# Patient Record
Sex: MALE | Race: OTHER | HISPANIC OR LATINO | URBAN - METROPOLITAN AREA
[De-identification: names, ages, dates, MRNs, and addresses within clinical notes are randomized per-mention and may not be internally consistent; named-entity substitution may affect disease eponyms.]

---

## 2017-05-04 ENCOUNTER — EMERGENCY (EMERGENCY)
Facility: HOSPITAL | Age: 27
LOS: 1 days | Discharge: HOME | End: 2017-05-04
Attending: EMERGENCY MEDICINE | Admitting: EMERGENCY MEDICINE
Payer: MEDICAID

## 2017-05-04 VITALS
OXYGEN SATURATION: 100 % | RESPIRATION RATE: 20 BRPM | WEIGHT: 240.97 LBS | HEART RATE: 80 BPM | DIASTOLIC BLOOD PRESSURE: 91 MMHG | SYSTOLIC BLOOD PRESSURE: 138 MMHG | TEMPERATURE: 98 F | HEIGHT: 67 IN

## 2017-05-04 DIAGNOSIS — R11.0 NAUSEA: ICD-10-CM

## 2017-05-04 DIAGNOSIS — R51 HEADACHE: ICD-10-CM

## 2017-05-04 LAB
ALBUMIN SERPL ELPH-MCNC: 4.5 G/DL — SIGNIFICANT CHANGE UP (ref 3.3–5.2)
ALP SERPL-CCNC: 71 U/L — SIGNIFICANT CHANGE UP (ref 40–120)
ALT FLD-CCNC: 35 U/L — SIGNIFICANT CHANGE UP
ANION GAP SERPL CALC-SCNC: 13 MMOL/L — SIGNIFICANT CHANGE UP (ref 5–17)
APPEARANCE UR: CLEAR — SIGNIFICANT CHANGE UP
AST SERPL-CCNC: 26 U/L — SIGNIFICANT CHANGE UP
BASOPHILS # BLD AUTO: 0 K/UL — SIGNIFICANT CHANGE UP (ref 0–0.2)
BASOPHILS NFR BLD AUTO: 0.1 % — SIGNIFICANT CHANGE UP (ref 0–2)
BILIRUB SERPL-MCNC: 0.5 MG/DL — SIGNIFICANT CHANGE UP (ref 0.4–2)
BILIRUB UR-MCNC: NEGATIVE — SIGNIFICANT CHANGE UP
BUN SERPL-MCNC: 10 MG/DL — SIGNIFICANT CHANGE UP (ref 8–20)
CALCIUM SERPL-MCNC: 9.6 MG/DL — SIGNIFICANT CHANGE UP (ref 8.6–10.2)
CHLORIDE SERPL-SCNC: 100 MMOL/L — SIGNIFICANT CHANGE UP (ref 98–107)
CO2 SERPL-SCNC: 27 MMOL/L — SIGNIFICANT CHANGE UP (ref 22–29)
COLOR SPEC: YELLOW — SIGNIFICANT CHANGE UP
CREAT SERPL-MCNC: 0.7 MG/DL — SIGNIFICANT CHANGE UP (ref 0.5–1.3)
DIFF PNL FLD: NEGATIVE — SIGNIFICANT CHANGE UP
EOSINOPHIL # BLD AUTO: 0 K/UL — SIGNIFICANT CHANGE UP (ref 0–0.5)
EOSINOPHIL NFR BLD AUTO: 0.5 % — SIGNIFICANT CHANGE UP (ref 0–5)
GLUCOSE SERPL-MCNC: 117 MG/DL — HIGH (ref 70–115)
GLUCOSE UR QL: NEGATIVE MG/DL — SIGNIFICANT CHANGE UP
HCT VFR BLD CALC: 41.9 % — LOW (ref 42–52)
HGB BLD-MCNC: 14.6 G/DL — SIGNIFICANT CHANGE UP (ref 14–18)
KETONES UR-MCNC: NEGATIVE — SIGNIFICANT CHANGE UP
LEUKOCYTE ESTERASE UR-ACNC: NEGATIVE — SIGNIFICANT CHANGE UP
LYMPHOCYTES # BLD AUTO: 2.1 K/UL — SIGNIFICANT CHANGE UP (ref 1–4.8)
LYMPHOCYTES # BLD AUTO: 24.8 % — SIGNIFICANT CHANGE UP (ref 20–55)
MCHC RBC-ENTMCNC: 28.7 PG — SIGNIFICANT CHANGE UP (ref 27–31)
MCHC RBC-ENTMCNC: 34.8 G/DL — SIGNIFICANT CHANGE UP (ref 32–36)
MCV RBC AUTO: 82.5 FL — SIGNIFICANT CHANGE UP (ref 80–94)
MONOCYTES # BLD AUTO: 0.6 K/UL — SIGNIFICANT CHANGE UP (ref 0–0.8)
MONOCYTES NFR BLD AUTO: 7.5 % — SIGNIFICANT CHANGE UP (ref 3–10)
NEUTROPHILS # BLD AUTO: 5.5 K/UL — SIGNIFICANT CHANGE UP (ref 1.8–8)
NEUTROPHILS NFR BLD AUTO: 66.9 % — SIGNIFICANT CHANGE UP (ref 37–73)
NITRITE UR-MCNC: NEGATIVE — SIGNIFICANT CHANGE UP
PH UR: 5 — SIGNIFICANT CHANGE UP (ref 5–8)
PLATELET # BLD AUTO: 258 K/UL — SIGNIFICANT CHANGE UP (ref 150–400)
POTASSIUM SERPL-MCNC: 4 MMOL/L — SIGNIFICANT CHANGE UP (ref 3.5–5.3)
POTASSIUM SERPL-SCNC: 4 MMOL/L — SIGNIFICANT CHANGE UP (ref 3.5–5.3)
PROT SERPL-MCNC: 8 G/DL — SIGNIFICANT CHANGE UP (ref 6.6–8.7)
PROT UR-MCNC: NEGATIVE MG/DL — SIGNIFICANT CHANGE UP
RBC # BLD: 5.08 M/UL — SIGNIFICANT CHANGE UP (ref 4.6–6.2)
RBC # FLD: 13 % — SIGNIFICANT CHANGE UP (ref 11–15.6)
SODIUM SERPL-SCNC: 140 MMOL/L — SIGNIFICANT CHANGE UP (ref 135–145)
SP GR SPEC: 1.02 — SIGNIFICANT CHANGE UP (ref 1.01–1.02)
UROBILINOGEN FLD QL: NEGATIVE MG/DL — SIGNIFICANT CHANGE UP
WBC # BLD: 8.3 K/UL — SIGNIFICANT CHANGE UP (ref 4.8–10.8)
WBC # FLD AUTO: 8.3 K/UL — SIGNIFICANT CHANGE UP (ref 4.8–10.8)

## 2017-05-04 PROCEDURE — 70450 CT HEAD/BRAIN W/O DYE: CPT

## 2017-05-04 PROCEDURE — 70450 CT HEAD/BRAIN W/O DYE: CPT | Mod: 26

## 2017-05-04 PROCEDURE — T1013: CPT

## 2017-05-04 PROCEDURE — 85027 COMPLETE CBC AUTOMATED: CPT

## 2017-05-04 PROCEDURE — 96374 THER/PROPH/DIAG INJ IV PUSH: CPT

## 2017-05-04 PROCEDURE — 99284 EMERGENCY DEPT VISIT MOD MDM: CPT

## 2017-05-04 PROCEDURE — 96375 TX/PRO/DX INJ NEW DRUG ADDON: CPT

## 2017-05-04 PROCEDURE — 81003 URINALYSIS AUTO W/O SCOPE: CPT

## 2017-05-04 PROCEDURE — 80053 COMPREHEN METABOLIC PANEL: CPT

## 2017-05-04 PROCEDURE — 99284 EMERGENCY DEPT VISIT MOD MDM: CPT | Mod: 25

## 2017-05-04 RX ORDER — METOCLOPRAMIDE HCL 10 MG
10 TABLET ORAL ONCE
Qty: 0 | Refills: 0 | Status: COMPLETED | OUTPATIENT
Start: 2017-05-04 | End: 2017-05-04

## 2017-05-04 RX ORDER — KETOROLAC TROMETHAMINE 30 MG/ML
30 SYRINGE (ML) INJECTION ONCE
Qty: 0 | Refills: 0 | Status: DISCONTINUED | OUTPATIENT
Start: 2017-05-04 | End: 2017-05-04

## 2017-05-04 RX ORDER — SODIUM CHLORIDE 9 MG/ML
3 INJECTION INTRAMUSCULAR; INTRAVENOUS; SUBCUTANEOUS EVERY 8 HOURS
Qty: 0 | Refills: 0 | Status: DISCONTINUED | OUTPATIENT
Start: 2017-05-04 | End: 2017-05-08

## 2017-05-04 RX ADMIN — Medication 10 MILLIGRAM(S): at 15:11

## 2017-05-04 RX ADMIN — Medication 30 MILLIGRAM(S): at 15:11

## 2017-05-04 NOTE — ED STATDOCS - PROGRESS NOTE DETAILS
Patient presented to [facility] ER c/o posterior HA since earlier today, positive nausea. negative vomiting. patient has similar history of headaches, patient received meds, states feels relief, will follow up CT that was ordered by attending,  will dispo per attending

## 2017-05-04 NOTE — ED STATDOCS - NS ED MD SCRIBE ATTENDING SCRIBE SECTIONS
VITAL SIGNS( Pullset)/DISPOSITION/HISTORY OF PRESENT ILLNESS/REVIEW OF SYSTEMS/PHYSICAL EXAM/HIV/PAST MEDICAL/SURGICAL/SOCIAL HISTORY

## 2017-05-04 NOTE — ED STATDOCS - ATTENDING CONTRIBUTION TO CARE
I, Jaime Nelson, performed the initial face to face bedside interview with this patient regarding history of present illness, review of symptoms and relevant past medical, social and family history.  I completed an independent physical examination.  I was the initial provider who evaluated this patient. I have signed out the follow up of any pending tests (i.e. labs, radiological studies) to the ACP.  I have communicated the patient’s plan of care and disposition with the ACP.  The history, relevant review of systems, past medical and surgical history, medical decision making, and physical examination was documented by the scribe in my presence and I attest to the accuracy of the documentation.

## 2017-05-04 NOTE — ED ADULT TRIAGE NOTE - CHIEF COMPLAINT QUOTE
Patient arrived to ED today with c/o headache, dizziness, nausea, and blurry vision when bending over.  Patient denies injury of any kind. Patient arrived to ED today with c/o headache, dizziness, nausea, abdominal discomfort, and blurry vision when bending over.  Patient denies injury of any kind.

## 2017-05-04 NOTE — ED STATDOCS - OBJECTIVE STATEMENT
: Mimi. 27 y/o M pt w/ PMHx of headache presents to the ED c/o occipital headache onset today morning, worsening now. Pt states that he went to the store today and began to feel lightheaded and nauseated. Pt has a hx of headache and states that this headache feels similar to his past ones. Pt has never been w/u for headache. Pt denies fever, chills, neck stiffness, vomiting, or any other complaints. NKDA.

## 2017-05-04 NOTE — ED ADULT NURSE NOTE - CHIEF COMPLAINT QUOTE
Patient arrived to ED today with c/o headache, dizziness, nausea, abdominal discomfort, and blurry vision when bending over.  Patient denies injury of any kind.

## 2017-05-04 NOTE — ED STATDOCS - MEDICAL DECISION MAKING DETAILS
27 y/o M pt w/ occipital headache, same as ones in the past. Pt slightly nauseated. Will get Toradol, Reglan, CT head and reassess.

## 2017-11-07 ENCOUNTER — EMERGENCY (EMERGENCY)
Facility: HOSPITAL | Age: 27
LOS: 1 days | Discharge: HOME | End: 2017-11-07
Attending: EMERGENCY MEDICINE
Payer: MEDICAID

## 2017-11-07 VITALS
SYSTOLIC BLOOD PRESSURE: 134 MMHG | HEART RATE: 75 BPM | OXYGEN SATURATION: 99 % | DIASTOLIC BLOOD PRESSURE: 84 MMHG | RESPIRATION RATE: 20 BRPM | TEMPERATURE: 99 F | WEIGHT: 250 LBS | HEIGHT: 66 IN

## 2017-11-07 VITALS
OXYGEN SATURATION: 100 % | DIASTOLIC BLOOD PRESSURE: 90 MMHG | SYSTOLIC BLOOD PRESSURE: 136 MMHG | HEART RATE: 69 BPM | TEMPERATURE: 98 F | RESPIRATION RATE: 18 BRPM

## 2017-11-07 PROCEDURE — 12001 RPR S/N/AX/GEN/TRNK 2.5CM/<: CPT

## 2017-11-07 PROCEDURE — 99283 EMERGENCY DEPT VISIT LOW MDM: CPT | Mod: 25

## 2017-11-07 PROCEDURE — 90715 TDAP VACCINE 7 YRS/> IM: CPT

## 2017-11-07 PROCEDURE — 90471 IMMUNIZATION ADMIN: CPT

## 2017-11-07 PROCEDURE — T1013: CPT

## 2017-11-07 RX ORDER — TETANUS TOXOID, REDUCED DIPHTHERIA TOXOID AND ACELLULAR PERTUSSIS VACCINE, ADSORBED 5; 2.5; 8; 8; 2.5 [IU]/.5ML; [IU]/.5ML; UG/.5ML; UG/.5ML; UG/.5ML
0.5 SUSPENSION INTRAMUSCULAR ONCE
Qty: 0 | Refills: 0 | Status: COMPLETED | OUTPATIENT
Start: 2017-11-07 | End: 2017-11-07

## 2017-11-07 RX ADMIN — TETANUS TOXOID, REDUCED DIPHTHERIA TOXOID AND ACELLULAR PERTUSSIS VACCINE, ADSORBED 0.5 MILLILITER(S): 5; 2.5; 8; 8; 2.5 SUSPENSION INTRAMUSCULAR at 19:26

## 2017-11-07 NOTE — ED STATDOCS - ATTENDING CONTRIBUTION TO CARE
I, Jennifer Liu, performed the initial face to face bedside interview with this patient regarding history of present illness, review of symptoms and relevant past medical, social and family history.  I completed an independent physical examination.  I was the initial provider who evaluated this patient. I have signed out the follow up of any pending tests (i.e. labs, radiological studies) to the ACP.  I have communicated the patient’s plan of care and disposition with the ACP.  The history, relevant review of systems, past medical and surgical history, medical decision making, and physical examination was documented by the scribe in my presence and I attest to the accuracy of the documentation.

## 2017-11-07 NOTE — ED STATDOCS - PROGRESS NOTE DETAILS
26 yo M PT complaining of laceration on L 2nd digit from knife. PT seen by intake MD, agree with H&P, PE, order, and plan. Will clean and repair laceration. PT laceration cleaned and repaired. Pt tolerated well. PT verbalized understanding of suture care and when to return to ED to have sutures removed.

## 2017-11-07 NOTE — ED STATDOCS - OBJECTIVE STATEMENT
28 y/o M pt presents to ED c/o laceration to left 2nd digit while at home. Unknown last tetanus. No further complaints at this time.   : Katharina

## 2017-11-07 NOTE — ED PROCEDURE NOTE - CPROC ED POST PROC CARE GUIDE1
Keep the cast/splint/dressing clean and dry./Verbal/written post procedure instructions were given to patient/caregiver./Instructed patient/caregiver to follow-up with primary care physician./Instructed patient/caregiver regarding signs and symptoms of infection.
Instructed patient/caregiver to follow-up with primary care physician./Verbal/written post procedure instructions were given to patient/caregiver./Instructed patient/caregiver regarding signs and symptoms of infection.

## 2022-04-19 ENCOUNTER — EMERGENCY (EMERGENCY)
Facility: HOSPITAL | Age: 32
LOS: 1 days | Discharge: HOME | End: 2022-04-19
Attending: EMERGENCY MEDICINE
Payer: COMMERCIAL

## 2022-04-19 VITALS
OXYGEN SATURATION: 99 % | RESPIRATION RATE: 16 BRPM | HEIGHT: 66 IN | TEMPERATURE: 99 F | WEIGHT: 315 LBS | DIASTOLIC BLOOD PRESSURE: 99 MMHG | SYSTOLIC BLOOD PRESSURE: 155 MMHG | HEART RATE: 84 BPM

## 2022-04-19 PROCEDURE — 99283 EMERGENCY DEPT VISIT LOW MDM: CPT

## 2022-04-19 RX ADMIN — Medication 40 MILLIGRAM(S): at 20:17

## 2022-04-19 NOTE — ED PROVIDER NOTE - PHYSICAL EXAMINATION
Gen: Nontoxic, well appearing, in NAD.  Skin: Warm and dry as visualized. Scattered red streaks/bumps, occasional vesicle to f/l forearms.   Head: NC/AT.  Eyes: PERRLA. EOMI.  Neck: Supple, FROM. Trachea midline.   Resp: No distress.  Cardio: Well perfused.  Ext: No deformities. MAEx4. FROM.   Neuro: A&Ox3. Appears nonfocal.   Psych: Normal affect and mood.

## 2022-04-19 NOTE — ED PROVIDER NOTE - NSFOLLOWUPINSTRUCTIONS_ED_ALL_ED_FT
- Prescription sent to pharmacy.  - Do not scratch as it will spread.  - Cleanse with soap and water.   - Please bring all documentation from your ED visit to any related future follow up appointment.  - Please call to schedule follow up appointment with your primary care physician within 24-48 hours.  - Please seek immediate medical attention or return to the ED for any new/worsening, signs/symptoms, or concerns.    Feel better!     - Receta enviada a farmacia.  - No rascar ya que se extenderá.  - Limpiar con agua y jabón.  - Traiga toda la documentación de shay visita al ED a cualquier ariela de seguimiento futura relacionada.  - Llame para programar leonora ariela de seguimiento con shay médico de atención primaria dentro de las 24 a 48 horas.  - Busque atención médica inmediata o regrese al servicio de urgencias por cualquier signo/síntoma o inquietud nuevos/empeorados.    ¡Sentirse mejor!        Dermatitis de contacto    Contact Dermatitis      La dermatitis es el enrojecimiento, el dolor y la hinchazón (inflamación) de la piel. La dermatitis de contacto es leonora reacción a ciertas sustancias que entran en contacto con la piel. Muchas sustancias diferentes pueden causar dermatitis de contacto. Hay dos tipos de dermatitis de contacto:  •Dermatitis de contacto irritativa. La causa de marcie tipo de dermatitis es algo que irrita la piel, eitan tener las francine secas por lavarlas muy seguido con jabón. Marcie tipo no requiere la exposición previa a la sustancia que causó la reacción. Marcie es el tipo más frecuente.      •Dermatitis de contacto alérgica. La causa de marcie tipo de dermatitis es leonora sustancia a la cual se es alérgico, eitan la hiedra venenosa. Marcie tipo se produce cuando se ha estado expuesto a la sustancia (alérgeno) y se produce sensibilidad a bettye. La dermatitis puede presentarse poco después de la primera exposición al alérgeno, o es posible que no aparezca hasta la próxima vez que se encuentre expuesto y cada vez a partir de entonces.        ¿Cuáles son las causas?    La causa más frecuente de la dermatitis de contacto irritativa es la exposición a lo siguiente:  •Maquillaje.      •Jabones.      •Detergentes.      •Lavandina.      •Ácidos.      •Sales metálicas, eitan el níquel.      La causa más frecuente de la dermatitis de contacto alérgica es la exposición a lo siguiente:  •Plantas venenosas.      •Productos químicos.      •Alhajas.      •Látex.      •Medicamentos.      •Conservantes que se utilizan en determinados productos, eitan la ropa.        ¿Qué incrementa el riesgo?    Es más probable que tenga esta afección si presenta:  •Un trabajo que lo expone a sustancias irritantes o a alérgenos.      •Determinadas afecciones médicas, por ejemplo, asma o eczema.        ¿Cuáles son los signos o los síntomas?     Los síntomas de esta afección pueden presentarse en cualquier parte del cuerpo con la que usted toque la sustancia irritante o donde la sustancia irritante lo haya tocado.•Algunos de los síntomas son los siguientes:  •Sequedad o descamación.      •Enrojecimiento.      •Grietas.      •Picazón.      •Dolor o sensación de ardor.      •Ampollas.      •Secreción de pequeñas cantidades de arin o líquido transparente que emanan de las grietas de la piel.        En el janeth de la dermatitis de contacto alérgica, puede yesenia hinchazón solo en algunas partes del cuerpo, eitan la boca o los genitales.      ¿Cómo se diagnostica?    Esta afección se diagnostica mediante leonora revisión de los antecedentes médicos y un examen físico.  •Se puede realizar leonora prueba del parche para ayudar a determinar la causa.      •Si la afección guarda relación con el trabajo, elzbieta vez deba consultar a un especialista en medicina ocupacional.        ¿Cómo se trata?    Esta afección se trata mediante un control para detectar la causa de la reacción y proteger la piel de nuevos contactos. El tratamiento también puede incluir lo siguiente:  •Cremas o ungüentos con corticoesteroides. En los casos más graves será necesario aplicar medicamentos con corticoesteroides por vía oral.      •Medicamentos con antibióticos o ungüentos antibacterianos, si hay leonora infección en la piel.      •Antihistamínicos en forma de loción o por vía oral para calmar la picazón.      •Leonora venda (vendaje).        Siga estas indicaciones en shay casa:    Cuidado de la piel     •Huméctese la piel según sea necesario.      •Aplique compresas frías en las zonas afectadas.      •Intente colocarse leonora pasta de bicarbonato de sodio sobre la piel. Agregue agua al bicarbonato hasta que tenga la consistencia de leonora pasta.      • No se rasque la piel, y evite la fricción de la danny afectada.      •Evite el uso de jabones, perfumes y tintes.      Medicamentos     •[place] o aplíquese los medicamentos de venta bob y los recetados solamente eitan se lo haya indicado el médico.      •Si le recetaron un antibiótico, tómelo o aplíqueselo eitan se lo haya indicado el médico. No deje de usar el antibiótico aunque la afección mejore.      Bañarse   •Trate de elias un baño con lo siguiente:  •Sales de [place]. Siga las indicaciones del envase. Puede conseguirlas en la hari de comestibles o la farmacia local.      •Bicarbonato de sodio. Vierta un poco en la bañera eitan se lo haya indicado el médico.      •Magaly coloidal. Siga las indicaciones del envase. Puede conseguirla en la hari de comestibles o la farmacia local.        •Báñese con menos frecuencia, por ejemplo, día por medio.      •Báñese con agua templada. No use [place].      Cuidado de la venda     •Si le colocaron leonora venda (vendaje), cámbiela eitan se lo haya indicado el médico.      •Lávese las francine con agua y jabón antes y después de cambiar el vendaje. Use desinfectante para francine si no dispone de agua y jabón.      Indicaciones generales   •Evite la sustancia que ha causado la erupción. Si no sabe qué la causó, lleve un diario para tratar de identificar la causa. Escriba los siguientes datos:  •Lo que come.      •Los cosméticos que utiliza.      •Lo que frederick.      •Lo que llevó puesto en la danny afectada. Kendall incluye las alhajas.      •Controle todos los días las zonas afectadas para detectar signos de infección. Esté atento a los siguientes signos:  •Aumento del enrojecimiento, la hinchazón o el dolor.      •Más líquido o arin.      •Calor.      •Pus o mal olor.        •Concurra a todas las visitas de seguimiento eitan se lo haya indicado el médico. Kendall es importante.        Comuníquese con un médico si:    •La afección no mejora con tratamiento.      •Shay afección empeora.      •Observa signos de infección, eitan hinchazón, sensibilidad, enrojecimiento, dolor o calor en la danny afectada.      •Tiene fiebre.      •Aparecen nuevos síntomas.        Solicite ayuda inmediatamente si:    •Tiene dolor de essence intenso o dolor o rigidez en el zulma.      •Vomita.      •Se siente muy somnoliento.      •Nota leonora línea richard en la piel que sale de la danny afectada.      •El hueso o la articulación que se encuentra por debajo de la danny afectada le duele después de que la piel ya se ha curado.      •La danny afectada se oscurece.      •Tiene dificultad para respirar.        Resumen    •La dermatitis es el enrojecimiento, el dolor y la hinchazón (inflamación) de la piel. La dermatitis de contacto es leonora reacción a ciertas sustancias que entran en contacto con la piel.      •Los síntomas de esta afección pueden presentarse en cualquier parte del cuerpo con la que usted toque la sustancia irritante o donde la sustancia irritante lo haya tocado.      •Esta afección se trata determinando la causa de la reacción y protegiendo la piel de nuevos contactos. El tratamiento también puede incluir medicamentos y cuidado de la piel.      •Evite la sustancia que ha causado la erupción. Si no sabe qué la causó, lleve un diario para tratar de identificar la causa.      •Comuníquese con un médico si shay afección empeora o si presenta signos de infección, eitan hinchazón, sensibilidad, enrojecimiento, dolor o calor en la danny afectada.      Esta información no tiene eitan fin reemplazar el consejo del médico. Asegúrese de hacerle al médico cualquier pregunta que tenga.

## 2022-04-19 NOTE — ED PROVIDER NOTE - CLINICAL SUMMARY MEDICAL DECISION MAKING FREE TEXT BOX
30 yo male no PMHx presents to ED c/o pruritic rash to arms after working in garden. Consistent with poison ivy/contact dermatitis. Patient educated on proper use of steroids. Patient to be discharged. Patient provided verbal/written discharge instructions and return precautions. Patient expresses understanding and agreement.

## 2022-04-19 NOTE — ED PROVIDER NOTE - NS ED ATTENDING STATEMENT MOD
This was a shared visit with the MATT. I reviewed and verified the documentation and independently performed the documented:

## 2022-04-19 NOTE — ED ADULT TRIAGE NOTE - CHIEF COMPLAINT QUOTE
Pt was out in his yard on friday and now has a rash to arms, hands and legs that itches. He isn't sure if he touched poison Ivy.

## 2022-04-19 NOTE — ED PROVIDER NOTE - PATIENT PORTAL LINK FT
You can access the FollowMyHealth Patient Portal offered by [facility] by registering at the following website: [email or web address]. By joining [employer]’s FollowMyHealth portal, you will also be able to view your health information using other applications (apps) compatible with our system.

## 2022-04-19 NOTE — ED PROVIDER NOTE - OBJECTIVE STATEMENT
30 yo male no PMHx presents to ED c/o rash to arms. States was working in garden Friday, developed rash following day. +Itchy, blisters. Put alcohol on skin. No further complaints at this time. 31 year old male no PMHx presents to ED c/o rash to arms. States was working in garden Friday, developed rash following day. +Itchy, blisters. Put alcohol on skin. No further complaints at this time.

## 2022-04-19 NOTE — ED PROVIDER NOTE - NSFOLLOWUPCLINICS_GEN_ALL_ED_FT
[id number] [place] [address]  Phone: [phone]  Fax:     [facility]  Dermatology  [address] [place] [address]  Phone: [phone]  Fax: [phone]